# Patient Record
Sex: MALE | Race: BLACK OR AFRICAN AMERICAN | NOT HISPANIC OR LATINO | Employment: UNEMPLOYED | RURAL
[De-identification: names, ages, dates, MRNs, and addresses within clinical notes are randomized per-mention and may not be internally consistent; named-entity substitution may affect disease eponyms.]

---

## 2022-02-28 DIAGNOSIS — L02.01 ABSCESS OF FACE: Primary | ICD-10-CM

## 2022-04-04 ENCOUNTER — OFFICE VISIT (OUTPATIENT)
Dept: DERMATOLOGY | Facility: CLINIC | Age: 47
End: 2022-04-04
Payer: COMMERCIAL

## 2022-04-04 VITALS — RESPIRATION RATE: 18 BRPM | BODY MASS INDEX: 20.83 KG/M2 | HEIGHT: 65 IN | WEIGHT: 125 LBS

## 2022-04-04 DIAGNOSIS — L30.9 DERMATITIS, UNSPECIFIED: Primary | ICD-10-CM

## 2022-04-04 DIAGNOSIS — L02.01 ABSCESS OF FACE: ICD-10-CM

## 2022-04-04 PROCEDURE — 99204 OFFICE O/P NEW MOD 45 MIN: CPT | Mod: 25,,, | Performed by: DERMATOLOGY

## 2022-04-04 PROCEDURE — 1160F PR REVIEW ALL MEDS BY PRESCRIBER/CLIN PHARMACIST DOCUMENTED: ICD-10-PCS | Mod: CPTII,,, | Performed by: DERMATOLOGY

## 2022-04-04 PROCEDURE — 87102 FUNGUS ISOLATION CULTURE: CPT | Mod: ,,, | Performed by: CLINICAL MEDICAL LABORATORY

## 2022-04-04 PROCEDURE — 11104 PUNCH BX SKIN SINGLE LESION: CPT | Mod: ,,, | Performed by: DERMATOLOGY

## 2022-04-04 PROCEDURE — 1160F RVW MEDS BY RX/DR IN RCRD: CPT | Mod: CPTII,,, | Performed by: DERMATOLOGY

## 2022-04-04 PROCEDURE — 87070 CULTURE OTHR SPECIMN AEROBIC: CPT | Mod: ,,, | Performed by: CLINICAL MEDICAL LABORATORY

## 2022-04-04 PROCEDURE — 3008F BODY MASS INDEX DOCD: CPT | Mod: CPTII,,, | Performed by: DERMATOLOGY

## 2022-04-04 PROCEDURE — 11105 PR PUNCH BIOPSY, SKIN, EA ADDTL LESION: ICD-10-PCS | Mod: ,,, | Performed by: DERMATOLOGY

## 2022-04-04 PROCEDURE — 87070 CULTURE, TISSUE: ICD-10-PCS | Mod: ,,, | Performed by: CLINICAL MEDICAL LABORATORY

## 2022-04-04 PROCEDURE — 3008F PR BODY MASS INDEX (BMI) DOCUMENTED: ICD-10-PCS | Mod: CPTII,,, | Performed by: DERMATOLOGY

## 2022-04-04 PROCEDURE — 87075 CULTURE, ANAEROBE: ICD-10-PCS | Mod: ,,, | Performed by: CLINICAL MEDICAL LABORATORY

## 2022-04-04 PROCEDURE — 87116 CULTURE, AFB: ICD-10-PCS | Mod: ,,, | Performed by: CLINICAL MEDICAL LABORATORY

## 2022-04-04 PROCEDURE — 87176 CULTURE, TISSUE: ICD-10-PCS | Mod: ,,, | Performed by: CLINICAL MEDICAL LABORATORY

## 2022-04-04 PROCEDURE — 11104 PR PUNCH BIOPSY, SKIN, SINGLE LESION: ICD-10-PCS | Mod: ,,, | Performed by: DERMATOLOGY

## 2022-04-04 PROCEDURE — 1159F PR MEDICATION LIST DOCUMENTED IN MEDICAL RECORD: ICD-10-PCS | Mod: CPTII,,, | Performed by: DERMATOLOGY

## 2022-04-04 PROCEDURE — 87116 MYCOBACTERIA CULTURE: CPT | Mod: ,,, | Performed by: CLINICAL MEDICAL LABORATORY

## 2022-04-04 PROCEDURE — 1159F MED LIST DOCD IN RCRD: CPT | Mod: CPTII,,, | Performed by: DERMATOLOGY

## 2022-04-04 PROCEDURE — 99204 PR OFFICE/OUTPT VISIT, NEW, LEVL IV, 45-59 MIN: ICD-10-PCS | Mod: 25,,, | Performed by: DERMATOLOGY

## 2022-04-04 PROCEDURE — 11105 PUNCH BX SKIN EA SEP/ADDL: CPT | Mod: ,,, | Performed by: DERMATOLOGY

## 2022-04-04 PROCEDURE — 87102 CULTURE, FUNGUS (OTHER): ICD-10-PCS | Mod: ,,, | Performed by: CLINICAL MEDICAL LABORATORY

## 2022-04-04 PROCEDURE — 87075 CULTR BACTERIA EXCEPT BLOOD: CPT | Mod: ,,, | Performed by: CLINICAL MEDICAL LABORATORY

## 2022-04-04 PROCEDURE — 87176 TISSUE HOMOGENIZATION CULTR: CPT | Mod: ,,, | Performed by: CLINICAL MEDICAL LABORATORY

## 2022-04-04 RX ORDER — BIMATOPROST 0.3 MG/ML
1 SOLUTION/ DROPS OPHTHALMIC
COMMUNITY

## 2022-04-04 RX ORDER — DORZOLAMIDE HCL 20 MG/ML
1 SOLUTION/ DROPS OPHTHALMIC
COMMUNITY

## 2022-04-04 RX ORDER — INSULIN DEGLUDEC 100 U/ML
8 INJECTION, SOLUTION SUBCUTANEOUS
COMMUNITY
Start: 2021-09-10

## 2022-04-04 RX ORDER — BRIMONIDINE TARTRATE 2 MG/ML
1 SOLUTION/ DROPS OPHTHALMIC
COMMUNITY

## 2022-04-04 RX ORDER — INSULIN ASPART 100 [IU]/ML
INJECTION, SOLUTION INTRAVENOUS; SUBCUTANEOUS
COMMUNITY
Start: 2022-02-16

## 2022-04-04 RX ORDER — LIDOCAINE AND PRILOCAINE 25; 25 MG/G; MG/G
CREAM TOPICAL
COMMUNITY
Start: 2022-03-15

## 2022-04-04 NOTE — PATIENT INSTRUCTIONS
Biopsy Site Care  Starting tomorrow you may shower and wash the area with dial antibacterial soap  Pat dry and apply vaseline and a bandaid  Perform this routine for three days  The area will be irritated, sore, slightly red, and may itch, sting, or burn  Do not apply make-up to the area until it is healed  There will be a scar  The area will take 1-2 weeks to heal  No soaking in the tub or hot tub for one week

## 2022-04-04 NOTE — PROGRESS NOTES
[facility]   Minoo Otoole MD    Patient Name: Dennis Agarwal  Patient YOB: 1975   Date of Service: 22    CC: Abcesses    HPI: Dennis Agarwal is a 46 y.o. male here today for abcesses, located on the face.  Abscesses has been present for 2 years.  Previous treatments include Linezoild.     Past Medical History:   Diagnosis Date    Chronic kidney disease, unspecified     DM (diabetes mellitus)     HTN (hypertension)      Past Surgical History:   Procedure Laterality Date    GLAUCOMA SURGERY       Review of patient's allergies indicates:   Allergen Reactions    Tolmetin Anaphylaxis     Does not take these secondary to decreased kidney function.    Ibuprofen Other (See Comments)     R/T kidney problems       Current Outpatient Medications:     insulin aspart U-100 (NOVOLOG FLEXPEN U-100 INSULIN) 100 unit/mL (3 mL) InPn pen, Less than 150 no insulin;151-200  1 units; 201-250 3 units; 251-300  5 units; 301-400 7 units; Over 400 9 units, Disp: , Rfl:     insulin degludec (TRESIBA FLEXTOUCH U-100) 100 unit/mL (3 mL) insulin pen, Inject 8 Units into the skin., Disp: , Rfl:     bimatoprost (LUMIGAN) 0.03 % ophthalmic drops, Apply 1 drop to eye., Disp: , Rfl:     brimonidine 0.2% (ALPHAGAN) 0.2 % Drop, Apply 1 drop to eye., Disp: , Rfl:     dorzolamide (TRUSOPT) 2 % ophthalmic solution, Apply 1 drop to eye., Disp: , Rfl:     LIDOcaine-prilocaine (EMLA) cream, SMARTSI Sparingly Topical 3 Times a Week, Disp: , Rfl:     ROS: A focused review of systems was obtained and negative.     Exam: A focused skin exam was performed. All areas examined were normal except as mentioned in the assessment and plan below.  General Appearance of the patient is well developed and well nourished.  Orientation: alert and oriented x 3.  Mood and affect: pleasant.    Assessment:   The primary encounter diagnosis was Dermatitis, unspecified. A diagnosis of Abscess of face was also pertinent to this visit.    Plan:    Dermatitis Unspecified  - recurrent abscesses on the left jawline, normal dentition   DDx: actinomyces vs dental sinus vs cyst    Plan: Counseling  I counseled the patient regarding the following:  Skin care: Patient instructed to use gentle skin care including dove unscented soap, CeraVe moisturizing cream, and fragrance free laundry detergent.  Expectations: The patient understands that there is not a definitive diagnosis at this time. Further testing or empiric therapy may be necessary to diagnose and improve the condition.  Contact office if: The patient develops a fever, or rash dramatically worsens despite treatment.    Plan: Biopsy by Punch Method  Location (1): left jawline.   Written consent was obtained and risks were reviewed including but not limited to scarring, infection, bleeding, scabbing, incomplete removal, nerve damage and allergy to anesthesia. The area was prepped with Chloraprep. Local anesthesia was obtained with approximately 0.5cc of 1% lidocaine with epinephrine. A 3mm punch biopsy (sent for H&E) was performed on the above listed location. Epidermal closure was achieved with 5-0 prolene. Following the biopsy Petrolatum and a bandage were applied. Patient will be notified of biopsy results. However, patient instructed to call the office if not contacted within 2 weeks. Suture removal in 10 days.    Plan: Biopsy by Punch Method  Location (2): left jawline.     Written consent was obtained and risks were reviewed including but not limited to scarring, infection, bleeding, scabbing, incomplete removal, nerve damage and allergy to anesthesia. The area was prepped with Chloraprep. Local anesthesia was obtained with approximately 0.5cc of 1% lidocaine with epinephrine. A 4mm punch biopsy (sent for tissue culture for aerobic, anerobic, AFB and fungal) was performed on the above listed location. Epidermal closure was achieved with 5-0 prolene. Following the biopsy Petrolatum and a bandage were  applied. Patient will be notified of biopsy results. However, patient instructed to call the office if not contacted within 2 weeks. Suture removal in 10 days.           Follow up in about 1 week (around 4/11/2022) for Suture Removal.    Minoo Otoole MD

## 2022-04-07 LAB — BACTERIA TISS AEROBE CULT: NORMAL

## 2022-04-08 LAB — BACTERIA SPEC ANAEROBE CULT: NORMAL

## 2022-04-19 ENCOUNTER — OFFICE VISIT (OUTPATIENT)
Dept: DERMATOLOGY | Facility: CLINIC | Age: 47
End: 2022-04-19
Payer: COMMERCIAL

## 2022-04-19 DIAGNOSIS — Z48.02 VISIT FOR SUTURE REMOVAL: ICD-10-CM

## 2022-04-19 DIAGNOSIS — L28.1 PRURIGO NODULARIS: Primary | ICD-10-CM

## 2022-04-19 PROCEDURE — 99213 OFFICE O/P EST LOW 20 MIN: CPT | Mod: ,,, | Performed by: DERMATOLOGY

## 2022-04-19 PROCEDURE — 99213 PR OFFICE/OUTPT VISIT, EST, LEVL III, 20-29 MIN: ICD-10-PCS | Mod: ,,, | Performed by: DERMATOLOGY

## 2022-04-19 RX ORDER — TRIAMCINOLONE ACETONIDE 0.25 MG/G
OINTMENT TOPICAL
Qty: 80 G | Refills: 2 | Status: SHIPPED | OUTPATIENT
Start: 2022-04-19

## 2022-04-19 NOTE — PROGRESS NOTES
[facility]   Minoo Otoole MD    Patient Name: Dennis Agarwal  Patient YOB: 1975   Date of Service: 22    CC: Suture removal    HPI: Dennis Agarwal is a 46 y.o. male here today for suture removal on the left jawline.  The area is not healing well.  Patient denies fever, chills, puss, or redness to the area.    Past Medical History:   Diagnosis Date    Chronic kidney disease, unspecified     DM (diabetes mellitus)     HTN (hypertension)      Past Surgical History:   Procedure Laterality Date    GLAUCOMA SURGERY       Review of patient's allergies indicates:   Allergen Reactions    Tolmetin Anaphylaxis     Does not take these secondary to decreased kidney function.    Ibuprofen Other (See Comments)     R/T kidney problems       Current Outpatient Medications:     bimatoprost (LUMIGAN) 0.03 % ophthalmic drops, Apply 1 drop to eye., Disp: , Rfl:     brimonidine 0.2% (ALPHAGAN) 0.2 % Drop, Apply 1 drop to eye., Disp: , Rfl:     dorzolamide (TRUSOPT) 2 % ophthalmic solution, Apply 1 drop to eye., Disp: , Rfl:     insulin aspart U-100 (NOVOLOG FLEXPEN U-100 INSULIN) 100 unit/mL (3 mL) InPn pen, Less than 150 no insulin;151-200  1 units; 201-250 3 units; 251-300  5 units; 301-400 7 units; Over 400 9 units, Disp: , Rfl:     insulin degludec (TRESIBA FLEXTOUCH U-100) 100 unit/mL (3 mL) insulin pen, Inject 8 Units into the skin., Disp: , Rfl:     LIDOcaine-prilocaine (EMLA) cream, SMARTSI Sparingly Topical 3 Times a Week, Disp: , Rfl:     triamcinolone acetonide 0.025% (KENALOG) 0.025 % Oint, Apply to AA twice daily, tapering with improvement., Disp: 80 g, Rfl: 2      Exam: A focused skin exam was performed. All areas examined were normal except as mentioned in the assessment and plan below.  General Appearance of the patient is well developed and well nourished.  Orientation: alert and oriented x 3.  Mood and affect: pleasant.    Assessment:   The primary encounter diagnosis was Prurigo  nodularis. A diagnosis of Visit for suture removal was also pertinent to this visit.    Plan:   Suture Removal (Global Period)  Body Locations: left jawline  I reviewed the pathology results with the patient in detail.  The examination of the site was clean, dry and intact. Sutures were removed.  Vaseline applied.    I counseled the patient regarding the following:  Expectations: Sutured wounds tend to have 50% of the strength of normal skin at the time of suture removal. Try avoiding rigorous exercise or lifting greater than 10 pounds.  Contact office if: you develop pain, redness, tenderness or pus at the surgical site.    A culture was not obtained from the area    Prurigo Nodularis  - erythematous nodules with central erosions located on the face    Plan: Counseling  I counseled the patient regarding the following:  Skin care: Recommend trimming nails short, anti-itch lotions such as Sarna, topical steroids and antihistamines.  Expectations: Prurigo Nodularis is a self-inflicted lesion that results from picking or rubbing the same spot of skin over and over again. If the itch-scratch cycle is broken, the lesions will resolve.  Contact office if: Prurigo Nodularis worsens, or if itching is accompanied by constitutional symptoms.  -Will send in  Triamcinolone 0.025%   - reviewed pathology with patient; emphasized to avoid picking the area    Medications Ordered This Encounter   Medications    triamcinolone acetonide 0.025% (KENALOG) 0.025 % Oint     Sig: Apply to AA twice daily, tapering with improvement.     Dispense:  80 g     Refill:  2         Follow up if symptoms worsen or fail to improve.    Minoo Otoole MD

## 2022-05-15 LAB — CULTURE, FUNGUS (OTHER): NORMAL

## 2022-06-01 LAB — MYCOBACTERIUM SPEC CULT: NORMAL
